# Patient Record
Sex: FEMALE | Race: WHITE | NOT HISPANIC OR LATINO | Employment: FULL TIME | URBAN - METROPOLITAN AREA
[De-identification: names, ages, dates, MRNs, and addresses within clinical notes are randomized per-mention and may not be internally consistent; named-entity substitution may affect disease eponyms.]

---

## 2018-12-18 ENCOUNTER — HOSPITAL ENCOUNTER (EMERGENCY)
Facility: MEDICAL CENTER | Age: 43
End: 2018-12-18
Attending: EMERGENCY MEDICINE
Payer: COMMERCIAL

## 2018-12-18 VITALS
HEART RATE: 64 BPM | TEMPERATURE: 99.2 F | RESPIRATION RATE: 15 BRPM | BODY MASS INDEX: 31.7 KG/M2 | OXYGEN SATURATION: 99 % | WEIGHT: 190.26 LBS | DIASTOLIC BLOOD PRESSURE: 80 MMHG | SYSTOLIC BLOOD PRESSURE: 132 MMHG | HEIGHT: 65 IN

## 2018-12-18 DIAGNOSIS — N92.6 IRREGULAR MENSES: ICD-10-CM

## 2018-12-18 DIAGNOSIS — B02.9 HERPES ZOSTER WITHOUT COMPLICATION: ICD-10-CM

## 2018-12-18 PROCEDURE — 99283 EMERGENCY DEPT VISIT LOW MDM: CPT

## 2018-12-18 RX ORDER — HYDROCODONE BITARTRATE AND ACETAMINOPHEN 5; 325 MG/1; MG/1
1-2 TABLET ORAL EVERY 6 HOURS PRN
Qty: 20 TAB | Refills: 0 | Status: SHIPPED | OUTPATIENT
Start: 2018-12-18 | End: 2018-12-21

## 2018-12-18 RX ORDER — VALACYCLOVIR HYDROCHLORIDE 1 G/1
1000 TABLET, FILM COATED ORAL 3 TIMES DAILY
Qty: 21 TAB | Refills: 0 | Status: SHIPPED | OUTPATIENT
Start: 2018-12-18 | End: 2018-12-25

## 2018-12-18 ASSESSMENT — PAIN SCALES - GENERAL: PAINLEVEL_OUTOF10: 6

## 2018-12-19 NOTE — ED TRIAGE NOTES
.  Chief Complaint   Patient presents with   • Herpes Zoster     left lower back   • Irregular Menses     requsting refferal to [facility] clinic     Rash to left lower back pt report previously on the right. No longer has prescription.

## 2018-12-19 NOTE — ED PROVIDER NOTES
ED Provider Note    CHIEF COMPLAINT  Chief Complaint   Patient presents with   • Herpes Zoster     left lower back   • Irregular Menses     requsting refferal to [facility] clinic       HPI  Елена La is a 43 y.o. female who presents for evaluation of shingles.  Patient is complaining of left lower back rash that is burning in nature.  She states she has had shingles in the past and it feels the same.  She is also asking for referral because she has irregular periods.    REVIEW OF SYSTEMS  See HPI for further details. All other systems negative.    PAST MEDICAL HISTORY  Past Medical History:   Diagnosis Date   • Anxiety    • Bipolar 2 disorder (HCC)    • Depression    • Head ache    • History of herpes zoster 2016   • Schizoaffective disorder (HCC)    • Seizure disorder (HCC)    • Substance abuse (HCC)        FAMILY HISTORY  Family History   Problem Relation Age of Onset   • Lung Disease Mother    • Cancer Father        SOCIAL HISTORY  Social History     Social History   • Marital status: Single     Spouse name: N/A   • Number of children: N/A   • Years of education: N/A     Social History Main Topics   • Smoking status: Light Tobacco Smoker     Types: Cigarettes   • Smokeless tobacco: Former User   • Alcohol use No   • Drug use: Yes     Types: Marijuana   • Sexual activity: Not Currently     Partners: Male     Other Topics Concern   • Not on file     Social History Narrative   • No narrative on file       SURGICAL HISTORY  Past Surgical History:   Procedure Laterality Date   • OTHER ORTHOPEDIC SURGERY  2017    Right leg snapped in serveral paces has metal in leg   • APPENDECTOMY     • BLADDER PROGRAM     • PB  DELIVERY ONLY         CURRENT MEDICATIONS  Home Medications     Reviewed by Clare Guerrero R.N. (Registered Nurse) on 18 at 1608  Med List Status: Complete   Medication Last Dose Status   divalproex (DEPAKOTE) 500 MG Tablet Delayed Response 2018 Active           "      ALLERGIES  No Known Allergies    PHYSICAL EXAM  VITAL SIGNS: /80   Pulse 64   Temp 37.3 °C (99.2 °F) (Temporal)   Resp 15   Ht 1.651 m (5' 5\")   Wt 86.3 kg (190 lb 4.1 oz)   SpO2 99%   BMI 31.66 kg/m²   Constitutional: Well developed, Well nourished, No acute distress, Non-toxic appearance.   HENT: Normocephalic, Atraumatic.  Cardiovascular: Normal heart rate.   Thorax & Lungs: No respiratory distress.   Skin: Warm, Dry.  Back:.  Vesicular and erythematous rash in the left lower lumbar region.  Musculoskeletal: Good range of motion in all major joints.    Neurologic: Awake and alert, No focal deficits noted.       COURSE & MEDICAL DECISION MAKING  Pertinent Labs & Imaging studies reviewed. (See chart for details)  Is a 43-year-old here for evaluation of a rash.  On exam she appears to have shingles.  I discussed this with her.  I explained that it is contagious.  I will start her on valacyclovir and on [place].  I reviewed her prescription monitoring report and she will sign a consent for treatment with narcotics.  I referred her to Dr. Grey of GYN for follow-up regarding her irregular periods.    FINAL IMPRESSION  1.  Shingles  2.  Irregular periods  3.         Electronically signed by: Uli Fermin, 12/18/2018 4:28 PM    "

## 2018-12-19 NOTE — ED NOTES
RN went through the pts. Discharge paperwork with the pt. And the pts. Significant other. RN also went through the importance of following up with an OBGYN about her irregular menses. The pt. Was very receptive to instructions.     The pt. Left ambulatory with her significant other.